# Patient Record
Sex: FEMALE | Race: WHITE | NOT HISPANIC OR LATINO | Employment: OTHER | URBAN - METROPOLITAN AREA
[De-identification: names, ages, dates, MRNs, and addresses within clinical notes are randomized per-mention and may not be internally consistent; named-entity substitution may affect disease eponyms.]

---

## 2022-08-18 ENCOUNTER — TELEPHONE (OUTPATIENT)
Dept: PLASTIC SURGERY | Facility: CLINIC | Age: 77
End: 2022-08-18
Payer: MEDICARE

## 2022-08-18 NOTE — TELEPHONE ENCOUNTER
----- Message from Shabnam Fiore sent at 8/18/2022  1:51 PM CDT -----  .Type:  Needs Medical Advice    Who Called: Idania Cardona    Symptoms (please be specific): -   How long has patient had these symptoms:  -  Pharmacy name and phone #:  -  Would the patient rather a call back or a response via [facility]?    Best Call Back Number: [phone]    Additional Information: pt wants to know if a referral has been received for her from Dr Ellsworth ( fx'd over on Mon) please call to advise

## 2022-08-22 ENCOUNTER — OFFICE VISIT (OUTPATIENT)
Dept: PLASTIC SURGERY | Facility: CLINIC | Age: 77
End: 2022-08-22
Payer: MEDICARE

## 2022-08-22 VITALS
SYSTOLIC BLOOD PRESSURE: 153 MMHG | WEIGHT: 153 LBS | HEIGHT: 61 IN | TEMPERATURE: 98 F | DIASTOLIC BLOOD PRESSURE: 83 MMHG | BODY MASS INDEX: 28.89 KG/M2 | HEART RATE: 92 BPM

## 2022-08-22 DIAGNOSIS — Z85.3 HISTORY OF BREAST CANCER: Primary | ICD-10-CM

## 2022-08-22 DIAGNOSIS — C50.911 RECURRENT BREAST CANCER, RIGHT: ICD-10-CM

## 2022-08-22 PROCEDURE — 99204 PR OFFICE/OUTPT VISIT, NEW, LEVL IV, 45-59 MIN: ICD-10-PCS | Mod: S$GLB,,, | Performed by: SURGERY

## 2022-08-22 PROCEDURE — 3079F DIAST BP 80-89 MM HG: CPT | Mod: CPTII,S$GLB,, | Performed by: SURGERY

## 2022-08-22 PROCEDURE — 3077F SYST BP >= 140 MM HG: CPT | Mod: CPTII,S$GLB,, | Performed by: SURGERY

## 2022-08-22 PROCEDURE — 3079F PR MOST RECENT DIASTOLIC BLOOD PRESSURE 80-89 MM HG: ICD-10-PCS | Mod: CPTII,S$GLB,, | Performed by: SURGERY

## 2022-08-22 PROCEDURE — 3077F PR MOST RECENT SYSTOLIC BLOOD PRESSURE >= 140 MM HG: ICD-10-PCS | Mod: CPTII,S$GLB,, | Performed by: SURGERY

## 2022-08-22 PROCEDURE — 99204 OFFICE O/P NEW MOD 45 MIN: CPT | Mod: S$GLB,,, | Performed by: SURGERY

## 2022-08-22 RX ORDER — ATORVASTATIN CALCIUM 80 MG/1
TABLET, FILM COATED ORAL
COMMUNITY
Start: 2022-07-08

## 2022-08-22 RX ORDER — SERTRALINE HYDROCHLORIDE 50 MG/1
TABLET, FILM COATED ORAL
COMMUNITY
Start: 2022-07-05

## 2022-08-22 RX ORDER — LISINOPRIL 5 MG/1
TABLET ORAL
COMMUNITY
Start: 2022-05-31

## 2022-08-22 RX ORDER — GABAPENTIN 600 MG/1
TABLET ORAL
COMMUNITY
Start: 2022-05-31

## 2022-08-22 RX ORDER — LEVOTHYROXINE SODIUM 50 UG/1
TABLET ORAL
COMMUNITY
Start: 2022-05-31

## 2022-08-22 NOTE — PROGRESS NOTES
REFERRAL FOR BREAST RECONSTRUCTION           8/22/   CHIEF COMPLAINT   breast cancer consult- R breast masectomy    Referring Provider: Dr. Ellsworth  PCP: Kenyetta Aguillon MD    PLACIDO Cardona is a 77 y.o. 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 Degroot    Previous Surgery:  Lumpectomy-yes 2006    Treating surgeon  Dr. Nicholson    Second opinions no    PMH  Past Medical History:   Diagnosis Date    Breast cancer 2006    right    Breast cancer 2022    right ER+ MT+ HER2+    DM (diabetes mellitus)     HTN (hypertension)        PSH  Past Surgical History:   Procedure Laterality Date    BREAST LUMPECTOMY Right     CHOLECYSTECTOMY      HYSTERECTOMY         FH  No family history on file.    MEDICATIONS  No outpatient medications have been marked as taking for the 8/22/22 encounter (Office Visit) with Teresa Klein MD.       ALLERGIES  Review of patient's allergies indicates:  No Known Allergies    SOCIAL HISTORY  Tobacco:   Social History     Tobacco Use   Smoking Status Never Smoker   Smokeless Tobacco Never Used     EtOH:   Social History     Substance and Sexual Activity   Alcohol Use Never       ROS  Review of Systems   Constitutional: Negative for chills and fever.   HENT: Negative for congestion.    Eyes: Negative for blurred vision and double vision.   Respiratory: Negative for cough and shortness of breath.    Cardiovascular: Negative for chest pain and palpitations.   Gastrointestinal: Negative for abdominal pain, nausea and vomiting.   Genitourinary: Negative for dysuria and  "frequency.   Musculoskeletal: Negative for back pain and neck pain.   Skin: Negative for itching and rash.   Neurological: Negative for dizziness, seizures and headaches.   Endo/Heme/Allergies: Does not bruise/bleed easily.   Psychiatric/Behavioral: Negative for depression and substance abuse.       PHYSICAL EXAM  BP (!) 153/83   Pulse 92   Temp 98.1 °F (36.7 °C)   Ht 5' 1" (1.549 m)   Wt 69.4 kg (153 lb)   BMI 28.91 kg/m²     Constitutional: She is oriented to person, place, and time. She appears well-developed and well-nourished.   HENT: Normocephalic and atraumatic.   Neck: Normal range of motion. Neck supple. No JVD present.   Cardiovascular: Normal rate, regular rhythm and normal heart sounds.    Pulmonary/Chest: Effort normal. No respiratory distress.   Musculoskeletal: Normal range of motion. She exhibits no edema or deformity.   Neurological: She is alert and oriented to person, place, and time. No sensory deficit. She exhibits normal muscle tone.   Skin: Skin is warm. No rash noted. No erythema.   Psychiatric: She has a normal mood and affect. Her behavior is normal.      Right breast upper outer quad transverse incision healed  Lumpectomy defect here with overlying ecchymosis        ASSESSMENT  Recurrent breast cancer     Options for breast reconstruction were discussed as detailed below, including the option for no reconstruction, implant-based reconstruction, and autologous tissue reconstruction.  The expected outcomes, risks, benefits, and alternatives of each option were discussed.  I additionally discussed the options for timing of reconstruction including immediate, delayed and delayed-immediate.     Reconstruction at the time of mastectomy reconstruction has a predictably higher rate of some perioperative complications than if performed after healing from a mastectomy. These include skin necrosis, infection, failure to clear the surgical margins discovered after mastectomy, unanticipated " up-staging. These may be more critical management issues with device-based reconstruction, although flap loss may also be a factor.    Postsurgical complications may interfere with timely adjuvant therapy. Adjuvant radiation therapy may significantly degrade the cosmetic result of immediate breast reconstruction. Postoperative wound complications or infections lead to additional unplanned office or emergency visits for follow up, re-operations, added expenses, pain, and disability, including inability to resume working in a timely manner.         Options for breast reconstruction reviewed:         Two-stage expander and long-term implant vs direct-to-implant  Risks and limitations of this choice were discussed and written for these procedures.  A particular focus was placed upon possible limited aesthetic results in both shape and feel with this option.  Future need for surgery was also reviewed related to deflation and rupture of implant, distortion, capsular contracture and poor aesthetic outcome including visible wrinkling.    If staged technique is recommended, the first stage is performed immediately after completion of the mastectomy provided the sentinel lymph node biopsy is negative. An adjustable breast implant (expander) is placed above or deep to the pectoralis major muscle, and sometimes it is covered with a human acellular dermal graft. After uneventful healing the expander is inflated over several months, and subsequently replaced with a long term implant.    SUSIE, TRAM flap, Other  flaps (SGAP, PAP, Lateral thigh)  Details, risks and limitations of the use of free autologous tissue from the abdomen, thigh, and buttock were discussed.  Potential flap complications including fat necrosis, partial or complete flap loss, seroma, infection, bleeding, and need for further surgery.  For abdominal-based flaps, the risks of a functional deficit created by sacrifice of some or all of the rectus  muscle, potential abdominal bulge or hernia that may not be correctable, abdominal wall numbness, umbilical necrosis and skin flap necrosis with resultant need for additional surgery were discussed.  Recovery time of at least two months and possible prolonged recovery of 3-6 months were likewise emphasized.    Latissimus dorsi flap with or without implant  Details, risks and limitations of this choice were discussed.  Specific focus was placed upon the asymmetric scar across her entire back with possible puckering at each end, and possible long-term seroma, dehiscence, and back skin flap necrosis. Permanent functional restrictions were reviewed, as well as the potential for prolonged early disability of at least two months. A longer recovery of at least 3-6 months before unrestricted activity might be achieved were also discussed. The frequent need for an implant under the latissimus muscle to provide sufficient breast mound projection was reviewed, along with potential problems this might create.    SYMMETRY  A contralateral breast reduction or mastopexy may improve symmetry. Potential problems were outlined. Contrateral mastectomy and reconstruction was also reviewed in this context.  Possible risks of breast reduction and mastopexy include but are not limited to: bleeding, infection, heavy and prolonged or permanent scarring, possible keloid formation, breasts different size and shape, breast lumps, loss of nipple sensation, hypersensitivity of nipple-areolar complex, wound separation or delayed wound healing, venous thromboembolism, complications from anesthesia, difficulty with future mammograms, emotional problems from altered breast size, a negative impact on relationships with a significant other sexual partner, desired breast size not attained: breast size too small or too large, difficult bra fitting, poor cosmetic outcome, puckering of incisions, irregular shape, nipple location, need for further  surgery    REVISION  Secondary revision surgery including autogenous fat grafting and nipple areolar reconstruction was reviewed.    Autogenous fat grafts might be necessary to improve skin thickness and enhance symmetric chest wall and breast mound symmetry and contour at a subsequent reconstructive procedure. Possible adverse outcomes, risks, and complications of fat grafting discussed include but are not limited to: Fat necrosis with a lumps, bleeding, infection, insufficient or excessive change in the size or shape, unevenness in the area grafted or donor sites, poor wound healing, inability to achieve desired cosmetic outcome, need for further interventions or surgery,iImplant puncture, venous thromboembolism    NIPPLE AREOLAR RECONSTRUCTION  Nipple reconstruction may be performed in 2 steps.     Risks of tattoo reviewed: pigment loss or irregularity, infection, slow healing, loss of implant (from infection), need for repeat tattooing    Risks of nipple mound creation reviewed:  Nipple flattening, asymmetry, fading tattoo pigment, poor cosmetic outcome, need for further surgery, bleeding, infection, poor scarring, wound separation, failure to heal, pain, need for further surgery, loss of implant from infection or wound separation    ---------------------------------    PLAN  Age precludes her from extensive recon  Options discussed were [place] with fat grafting vs external prosthetic  wld perform bilateral to achieve symmetry in 1 setting to minimize surgery time  Awaiting need for neoadj chemo     CPT 89743 adjacent tissue transfer 10-30 x 14  EACH breast is 30 x 14cm = 420  CPT 11643Wsf grafting  First 50  CPT 35917 Fat grafting Additional 50 x 5                     [facility]  [address]  [place] [address]  [phone] Work  [phone] Fax